# Patient Record
Sex: OTHER/UNKNOWN | URBAN - METROPOLITAN AREA
[De-identification: names, ages, dates, MRNs, and addresses within clinical notes are randomized per-mention and may not be internally consistent; named-entity substitution may affect disease eponyms.]

---

## 2022-08-08 ENCOUNTER — TELEPHONE (OUTPATIENT)
Dept: OBGYN CLINIC | Facility: HOSPITAL | Age: 82
End: 2022-08-08

## 2022-08-08 NOTE — TELEPHONE ENCOUNTER
Patient called to schedule appt with hand/wrist surgeon  Provided locations  Patient declined to schedule

## 2022-12-20 ENCOUNTER — EVALUATION (OUTPATIENT)
Dept: OCCUPATIONAL THERAPY | Facility: CLINIC | Age: 82
End: 2022-12-20

## 2022-12-20 DIAGNOSIS — M79.642 LEFT HAND PAIN: Primary | ICD-10-CM

## 2022-12-20 NOTE — LETTER
2022    Consuelo Santiago MD  [address] [facility] [address]    Patient: Chandni Rees   YOB: 1940   Date of Visit: 2022     Encounter Diagnosis     ICD-10-CM    1  Left hand pain  M79 642           Dear Dr Amy Walker: Thank you for your recent referral of Chandni Rees  Please review the attached evaluation summary from [facility]'s recent visit  Please verify that you agree with the plan of care by signing the attached order  If you have any questions or concerns, please do not hesitate to call  I sincerely appreciate the opportunity to share in the care of one of your patients and hope to have another opportunity to work with you in the near future  Sincerely,    Dennis Jauregui, OT      Referring Provider:     I certify that I have read the below Plan of Care and certify the need for these services furnished under this plan of treatment while under my care  Consuelo Santiago MD  [address] [address]  Via Fax: [phone]        OT Evaluation     Today's date: 2022  Patient name: Chandni Rees  :   MRN: [id number]  Referring provider: Lidya Roth MD  Dx:   Encounter Diagnosis     ICD-10-CM    1  Left hand pain  M79 642           Start Time: 1008  Stop Time: 1058  Total time in clinic (min): 50 minutes    Assessment  Assessment details: Patient reported that on 22 she had trigger finger releases 2nd and 3rd digits left hand  Patient reported that she had ultrasound last week and that she was told she has a lot of scar tissue causing issues  Patient has been splinting index finger to prevent pain with use  Patient reported swelling digits  Chandni Rees is a 81 y o  adult who presents with Left hand pain s/p trigger finger release (primary encounter diagnosis)  Patient tolerated session well   Croey Clayton reported difficulty with activities of daily living secondary to pain with function  Provided home exercise program for digit range of motion, edema control, and scar management  Patient was able to demonstrate home program past instruction with use of handouts  Patient educated on Coflex wrap for nighttime edema control  Patient advised to contact doctor if there is a change of status  Patient advised to discontinue any exercise which cause increased pain  Patient is a good candidate to benefit from skilled occupational therapy to address impairments and return to maximal level of function with minimal symptoms      Impairments: abnormal or restricted ROM, activity intolerance, impaired physical strength, lacks appropriate home exercise program and pain with function  Understanding of Dx/Px/POC: good   Prognosis: good    Goals  Short term goals:  Patient to demonstrate understanding of home exercise program in 2 weeks for decreased pain with activities of daily living  Patient to demonstrate increased  strength to 25 lbs in 4 weeks to increase  on full cup  Patient to demonstrate decreased edema by 1 cm in 4 weeks to increase range of motion for dressing  Patient to increase composite digital flexion to touch distal esqueda crease in 4 weeks to aid in holding utensils  Patient to report a decrease in pain by at least 1 point on a 0-10 scale in 2 weeks to aid in dressing    Long term goals:  Patient to demonstrate functional active range of motion for independent ADL's by time of discharge  Patient to demonstrate functional strength for independent ADL's by time of discharge  Patient to demonstrate understanding of final discharge home exercise program by time of discharge    Plan  Patient would benefit from: OT eval and skilled occupational therapy  Planned modality interventions: ultrasound and thermotherapy: hydrocollator packs  Planned therapy interventions: joint mobilization, manual therapy, massage, strengthening, stretching, therapeutic activities, therapeutic exercise, fine motor coordination training, flexibility, functional ROM exercises, home exercise program, patient education, graded activity and graded exercise  Frequency: 1-2x/week  Duration in weeks: 8  Plan of Care beginning date: 2022  Plan of Care expiration date: 2023  Treatment plan discussed with: patient        Subjective Evaluation    History of Present Illness  Date of surgery: 2022  Mechanism of injury: Patient reported that on 22 she had trigger finger releases 2nd and 3rd digits left hand  Patient reported that she had ultrasound last week and that she was told she has a lot of scar tissue causing issues  Patient has been splinting index finger to prevent pain with use  Patient reported swelling digits  "I can do everything, but it's very very painful  I can't hold anything heavy in it " Patient reported that she has been using lidocaine cream for pain relief  Pain  At best pain ratin  At worst pain rating: 10  Location: left palm- volar MPs  Quality: dull ache  Aggravating factors: lifting    Social Support    Employment status: not working  Hand dominance: right    Treatments  Previous treatment: injection treatment  Patient Goals  Patient goals for therapy: decreased pain, increased motion, increased strength and decreased edema          Objective     Observations     Left Wrist/Hand   Positive for adhesive scar and edema       Neurological Testing     Sensation     Wrist/Hand   Left   Intact: light touch    Right   Intact: light touch    Active Range of Motion     Left Digits    Flexion   Index     MCP: 46    PIP: 50    DIP: 30  Middle     MCP: 42    PIP: 65    DIP: 50  Extension   Index     MCP: -5  Middle     MCP: -5    Right Digits   Flexion   Index     MCP: 72    PIP: 78    DIP: 40  Middle     MCP: 74    PIP: 88    DIP: 68  Extension   Index     MCP: -5  Middle     MCP: -8    Strength/Myotome Testing     Left Wrist/Hand      (2nd hand position) Trial 1: 18 6    Thumb Strength  Palmar/Three-Point Pinch     Trial 1: 3 4    Right Wrist/Hand      (2nd hand position)     Trial 1: 44 9    Thumb Strength   Palmar/Three-Point Pinch     Trial 1: 10 3    Additional Strength Details  Patient reported pain with pinch testing    Swelling     Left Wrist/Hand   Index     Proximal: 7 4 cm  Middle     Proximal: 7 cm  Circumference MCP: 20 cm    Right Wrist/Hand   Index     Proximal: 7 cm  Middle     Proximal: 6 8 cm  Circumference MCP: 19 6 cm            Precautions: Universal     12/20            Visits 1            Manuals             STM- scar HEP                                                   Neuro Re-Ed                                                                                                        Ther Ex             Tendon glides x10            BROM index and long x10            Digit ext x10            Place and hold x10                                                                Ther Activity             Theraputty- , roll, pinch Yellow                                                                Modalities             Moist heat 5'

## 2022-12-20 NOTE — PROGRESS NOTES
OT Evaluation     Today's date: 2022  Patient name: Berta Crowell  :   MRN: [id number]  Referring provider: Hoang Urbano MD  Dx:   Encounter Diagnosis     ICD-10-CM    1  Left hand pain  M79 642           Start Time: 1008  Stop Time: 1058  Total time in clinic (min): 50 minutes    Assessment  Assessment details: Patient reported that on 22 she had trigger finger releases 2nd and 3rd digits left hand  Patient reported that she had ultrasound last week and that she was told she has a lot of scar tissue causing issues  Patient has been splinting index finger to prevent pain with use  Patient reported swelling digits  Berta Crowell is a 81 y o  adult who presents with Left hand pain s/p trigger finger release (primary encounter diagnosis)  Patient tolerated session well  Rhoda Tuttle reported difficulty with activities of daily living secondary to pain with function  Provided home exercise program for digit range of motion, edema control, and scar management  Patient was able to demonstrate home program past instruction with use of handouts  Patient educated on Coflex wrap for nighttime edema control  Patient advised to contact doctor if there is a change of status  Patient advised to discontinue any exercise which cause increased pain  Patient is a good candidate to benefit from skilled occupational therapy to address impairments and return to maximal level of function with minimal symptoms      Impairments: abnormal or restricted ROM, activity intolerance, impaired physical strength, lacks appropriate home exercise program and pain with function  Understanding of Dx/Px/POC: good   Prognosis: good    Goals  Short term goals:  Patient to demonstrate understanding of home exercise program in 2 weeks for decreased pain with activities of daily living  Patient to demonstrate increased  strength to 25 lbs in 4 weeks to increase  on full cup  Patient to demonstrate decreased edema by 1 cm in 4 weeks to increase range of motion for dressing  Patient to increase composite digital flexion to touch distal esqueda crease in 4 weeks to aid in holding utensils  Patient to report a decrease in pain by at least 1 point on a 0-10 scale in 2 weeks to aid in dressing    Long term goals:  Patient to demonstrate functional active range of motion for independent ADL's by time of discharge  Patient to demonstrate functional strength for independent ADL's by time of discharge  Patient to demonstrate understanding of final discharge home exercise program by time of discharge    Plan  Patient would benefit from: OT eval and skilled occupational therapy  Planned modality interventions: ultrasound and thermotherapy: hydrocollator packs  Planned therapy interventions: joint mobilization, manual therapy, massage, strengthening, stretching, therapeutic activities, therapeutic exercise, fine motor coordination training, flexibility, functional ROM exercises, home exercise program, patient education, graded activity and graded exercise  Frequency: 1-2x/week  Duration in weeks: 8  Plan of Care beginning date: 2022  Plan of Care expiration date: 2023  Treatment plan discussed with: patient        Subjective Evaluation    History of Present Illness  Date of surgery: 2022  Mechanism of injury: Patient reported that on 22 she had trigger finger releases 2nd and 3rd digits left hand  Patient reported that she had ultrasound last week and that she was told she has a lot of scar tissue causing issues  Patient has been splinting index finger to prevent pain with use  Patient reported swelling digits  "I can do everything, but it's very very painful  I can't hold anything heavy in it " Patient reported that she has been using lidocaine cream for pain relief    Pain  At best pain ratin  At worst pain rating: 10  Location: left palm- volar MPs  Quality: dull ache  Aggravating factors: lifting    Social Support    Employment status: not working  Hand dominance: right    Treatments  Previous treatment: injection treatment  Patient Goals  Patient goals for therapy: decreased pain, increased motion, increased strength and decreased edema          Objective     Observations     Left Wrist/Hand   Positive for adhesive scar and edema       Neurological Testing     Sensation     Wrist/Hand   Left   Intact: light touch    Right   Intact: light touch    Active Range of Motion     Left Digits    Flexion   Index     MCP: 46    PIP: 50    DIP: 30  Middle     MCP: 42    PIP: 65    DIP: 50  Extension   Index     MCP: -5  Middle     MCP: -5    Right Digits   Flexion   Index     MCP: 72    PIP: 78    DIP: 40  Middle     MCP: 74    PIP: 88    DIP: 68  Extension   Index     MCP: -5  Middle     MCP: -8    Strength/Myotome Testing     Left Wrist/Hand      (2nd hand position)     Trial 1: 18 6    Thumb Strength  Palmar/Three-Point Pinch     Trial 1: 3 4    Right Wrist/Hand      (2nd hand position)     Trial 1: 44 9    Thumb Strength   Palmar/Three-Point Pinch     Trial 1: 10 3    Additional Strength Details  Patient reported pain with pinch testing    Swelling     Left Wrist/Hand   Index     Proximal: 7 4 cm  Middle     Proximal: 7 cm  Circumference MCP: 20 cm    Right Wrist/Hand   Index     Proximal: 7 cm  Middle     Proximal: 6 8 cm  Circumference MCP: 19 6 cm             Precautions: Universal     12/20            Visits 1            Manuals             STM- scar HEP                                                   Neuro Re-Ed                                                                                                        Ther Ex             Tendon glides x10            BROM index and long x10            Digit ext x10            Place and hold x10                                                                Ther Activity             Theraputty- , roll, pinch Yellow Modalities             Moist heat 5'

## 2022-12-28 ENCOUNTER — OFFICE VISIT (OUTPATIENT)
Dept: OCCUPATIONAL THERAPY | Facility: CLINIC | Age: 82
End: 2022-12-28

## 2022-12-28 DIAGNOSIS — M79.642 LEFT HAND PAIN: Primary | ICD-10-CM

## 2022-12-28 NOTE — PROGRESS NOTES
Daily Note     Today's date: 2022  Patient name: Jatin Valle  : [id number]  MRN: [id number]  Referring provider: Tiffanie Irizarry MD  Dx:   Encounter Diagnosis     ICD-10-CM    1  Left hand pain  M79 642           Start Time: 1145  Stop Time: 1235  Total time in clinic (min): 50 minutes    Subjective: Patient reported some soreness with exercises  Objective: See treatment diary below      Assessment: Tolerated treatment well  Patient reported that she's been compliant with home exercises  She reported some discomfort with home exercises and was instructed to perform them twice daily to help with soreness  Patient reported that she wishes to begin ultrasound therapy this date  Use and potential benefits of ultrasound reviewed with patient  Patient tolerated treatment well with intact skin integrity pre/post modality  Patient demonstrated fatigue post treatment and would benefit from continued OT      Plan: Continue per plan of care  Progress treatment as tolerated            Precautions: Universal                Visits 1 2           Manuals             STM- scar HEP 5'                                                  Neuro Re-Ed                                                                                                        Ther Ex             Tendon glides x10 x10           BROM index and long x10 x10           Digit ext x10 x10           Place and hold x10 x10           Putty depress  Dowel in yellow                                                  Ther Activity             Theraputty- , roll, pinch Yellow            In hand manip  gems           Grooved pegs  opp                                     Modalities             Moist heat 5' 5'           Ultrasound  8'

## 2023-01-03 ENCOUNTER — OFFICE VISIT (OUTPATIENT)
Dept: OCCUPATIONAL THERAPY | Facility: CLINIC | Age: 83
End: 2023-01-03

## 2023-01-03 DIAGNOSIS — M79.642 LEFT HAND PAIN: Primary | ICD-10-CM

## 2023-01-03 NOTE — PROGRESS NOTES
Daily Note     Today's date: 1/3/2023  Patient name: Mohamud Wheeler  :   MRN: [id number]  Referring provider: Jhonny Aguilera MD  Dx:   Encounter Diagnosis     ICD-10-CM    1  Left hand pain  M79 642                      Subjective: "only sore right in the palm"  Volar DPC, II and III      Objective: See treatment diary below; Added edema care, scar vibration      Assessment: Tolerated treatment well  Patient reported that she's been compliant with home exercises  Pain isolated to volar MP/DPC of index and middle  Pain with full composite flexion  Reduced knuckle wrinkling observed in index comparing to the right  Edema localized in volar palm, MP/DPC  Plan: Continue per plan of care  Progress treatment as tolerated  Issued left small edema glove for HOS,  Added vibration for scar to HEP, applied kinesiotaping to web and volar palm, to be worn for up to 48 hours  Patient may call for replacement prior to next appointment           Precautions: Universal      1/3          Visits 1 2 3          Manuals   12'          STM- scar HEP 5' Volar palm  5'          kinesiotaping   Web, palm  2'            edema   vibrate  E glove  5'                       Neuro Re-Ed                                                                                                        Ther Ex     18'          Tendon glides x10 x10 Hook  2x10          BROM index and long x10 x10 x10 each          Digit ext x10 x10 P/H extend          Place and hold x10 x10 Fists  1x10          Putty depress  Dowel in yellow           PROM   IP's,   MP's  2x10 each                                    Ther Activity             Theraputty- , roll, pinch Yellow            In hand manip  gems           Grooved pegs  opp                                     Modalities             Moist heat 5' 5' 5'          Ultrasound  8' 8'

## 2023-01-10 ENCOUNTER — OFFICE VISIT (OUTPATIENT)
Dept: OCCUPATIONAL THERAPY | Facility: CLINIC | Age: 83
End: 2023-01-10

## 2023-01-10 DIAGNOSIS — M79.642 LEFT HAND PAIN: Primary | ICD-10-CM

## 2023-01-10 NOTE — PROGRESS NOTES
Daily Note     Today's date: 1/10/2023  Patient name: Sue Clark  :   MRN: [id number]  Referring provider: Fito Fontanez MD  Dx:   Encounter Diagnosis     ICD-10-CM    1  Left hand pain  M79 642                      Subjective: "I am in so much more pain  I carried heavy bags and used a shop vac"  Volar DPC, II and III- flooded basement, clean out over weekend  Objective: See treatment diary below; HEP change due to increase pain  Assessment: Tolerated treatment well  Pain increased to moderate in  volar MP/DPC of index and middle due to overuse  Pain with full composite flexion  Bilateral function reduced due to increased pain  Reduced knuckle wrinkling observed in index comparing to the right  Plan: Continue per plan of care  Progress treatment as tolerated  If pain does not reduce over the next 2 days, therapy will be cancelled until return to physician next Monday  HEP-  Kinesiotape, HP, CP, anti inflammatory/prescription, rest, edema glove          Precautions: Universal     12/20 12/28 1/3 1/10         Visits 1 2 3 4         Manuals   12' 10'         STM- scar HEP 5' Volar palm  5' Volar palm  5'         kinesiotaping   Web, palm  2'   Web palm  2'         edema   vibrate  E glove  5' RG mass  3'                      Neuro Re-Ed                                                                                                        Ther Ex     18'  15'         HEP    CP/HP  Edema glove, oral meds, k tape 10'         PROM digits    Hook, full F, full E  5'         Tendon glides x10 x10 Hook  2x10          BROM index and long x10 x10 x10 each          Digit ext x10 x10 P/H extend          Place and hold x10 x10 Fists  1x10          Putty depress  Dowel in yellow           PROM   IP's,   MP's  2x10 each                                    Ther Activity             Theraputty- , roll, pinch Yellow            In hand manip  gems           Grooved pegs  opp Modalities             Moist heat 5' 5' 5' 5'         Ultrasound  8' 8' hold

## 2023-01-12 ENCOUNTER — APPOINTMENT (OUTPATIENT)
Dept: OCCUPATIONAL THERAPY | Facility: CLINIC | Age: 83
End: 2023-01-12

## 2023-01-17 ENCOUNTER — OFFICE VISIT (OUTPATIENT)
Dept: OCCUPATIONAL THERAPY | Facility: CLINIC | Age: 83
End: 2023-01-17

## 2023-01-17 DIAGNOSIS — M79.642 LEFT HAND PAIN: Primary | ICD-10-CM

## 2023-01-17 NOTE — PROGRESS NOTES
Daily Note     Today's date: 2023  Patient name: Gertrude Rankin  :   MRN: [id number]  Referring provider: Giselle Robles MD  Dx:   Encounter Diagnosis     ICD-10-CM    1  Left hand pain  M79 642                      Subjective: "I am still in so much pain  The same  I did a lot of wringing"  II pain, full digit and  Volar DPC    Objective: See treatment diary below; HEP change due to add night conformer, oral medications, and CP after tasks  Assessment: Tolerated treatment well  Pain continue with  moderate pain in the volar MP/DPC of index and middle due to overuse  Pain with full composite flexion  Bilateral function reduced due to increased pain  Reduced knuckle wrinkling observed in index comparing to the right  Plan: Continue per plan of care  Progress treatment as tolerated  Assess pain level NV;  Consider hand based splint for index immobilization  HEP-  Kinesiotape, HP, CP, anti inflammatory/prescription, rest, edema glove          Precautions: Universal     12/20 12/28 1/3 1/10 1/17        Visits 1 2 3 4 5        Manuals   12' 10'         STM- scar HEP 5' Volar palm  5' Volar palm  5'         kinesiotaping   Web, palm  2'   Web palm  2'         edema   vibrate  E glove  5' RG mass  3'                      Neuro Re-Ed                                                                                                        Ther Ex     18'  15'   15'        HEP    CP/HP  Edema glove, oral meds, k tape 10' NEW HEP for edema, scar pain        PROM digits    Hook, full F, full E  5' Left II, III  5'        Tendon glides x10 x10 Hook  2x10          BROM index and long x10 x10 x10 each          Digit ext x10 x10 P/H extend          Place and hold x10 x10 Fists  1x10          Putty depress  Dowel in yellow           PROM   IP's,   MP's  2x10 each                                    Ther Activity             Theraputty- , roll, pinch Yellow            In hand manip  gems           Grooved pegs  opp                                     Modalities             Moist heat 5' 5' 5' 5' Pre tx        Ultrasound  8' 8' hold 8'

## 2023-01-25 ENCOUNTER — APPOINTMENT (OUTPATIENT)
Dept: OCCUPATIONAL THERAPY | Facility: CLINIC | Age: 83
End: 2023-01-25

## 2023-02-02 ENCOUNTER — OFFICE VISIT (OUTPATIENT)
Dept: OCCUPATIONAL THERAPY | Facility: CLINIC | Age: 83
End: 2023-02-02

## 2023-02-02 DIAGNOSIS — M79.642 LEFT HAND PAIN: Primary | ICD-10-CM

## 2023-02-02 NOTE — PROGRESS NOTES
Daily Note     Today's date: 2023  Patient name: Bethanie Mcintosh  : [id number]  MRN: [id number]  Referring provider: Rosalinda Paulino MD  Dx:   Encounter Diagnosis     ICD-10-CM    1  Left hand pain  M79 642                      Subjective: "I am still in so much pain  The pain is killing me  Nothing has helped"  Right and left index fingers  Right elbow  Objective: See treatment diary below; Modified splinting to provide hand based complete immobilization to the left index finger          Assessment: Tolerated treatment well  Pain continue with  moderate to severe pain in the volar MP/DPC of index and middle fingers  Pain with full composite flexion  Bilateral function reduced due to increased pain  Oval 8 splints ineffective in resolving pain  Plan: Continue per plan of care  Progress treatment as tolerated  Assess pain level NV; Discharge therapy if pain reduction not achieved and recommend return to physician  HEP-  Kinesiotape, HP, CP, anti inflammatory/prescription, rest, edema glove          Precautions: Universal     12/20 12/28 1/3 1/10 1/17 2       Visits 1 2 3 4 5 6       Manuals   12' 10'  10'       STM- scar HEP 5' Volar palm  5' Volar palm  5'  Volar palm  5'       kinesiotaping   Web, palm  2'   Web palm  2'         edema   vibrate  E glove  5' RG mass  3'  RG mass  5'                    Neuro Re-Ed                                       Orthotic      Revise to hand based  13'                                                           Ther Ex     18'  15'   15'   10'       HEP    CP/HP  Edema glove, oral meds, k tape 10' NEW HEP for edema, scar pain HEP-  Splint , no testing trigger       PROM digits    Hook, full F, full E  5' Left II, III  5' Left II, III 5'       Tendon glides x10 x10 Hook  2x10   Hook only   2x10       BROM index and long x10 x10 x10 each   x10 PIP, DIP       Digit ext x10 x10 P/H extend   hold       Place and hold x10 x10 Fists  1x10   hold       Putty depress  Dowel in yellow           PROM   IP's,   MP's  2x10 each                                    Ther Activity             Theraputty- , roll, pinch Yellow            In hand manip  gems           Grooved pegs  opp                                     Modalities             Moist heat 5' 5' 5' 5' Pre tx        Ultrasound  8' 8' hold 8'

## 2023-02-08 ENCOUNTER — OFFICE VISIT (OUTPATIENT)
Dept: OCCUPATIONAL THERAPY | Facility: CLINIC | Age: 83
End: 2023-02-08

## 2023-02-08 DIAGNOSIS — M79.642 LEFT HAND PAIN: Primary | ICD-10-CM

## 2023-02-08 NOTE — PROGRESS NOTES
OT Re-Evaluation  and OT Discharge     Today's date: 2023  Patient name: Harley Holley  : [birth year]  MRN: [id number]  Referring provider: Raza Branch MD  Dx:   Encounter Diagnosis     ICD-10-CM    1  Left hand pain  M79 642                      Assessment  Assessment details: Patient reported that on 22 she had trigger finger releases 2nd and 3rd digits left hand  Patient reported that she had ultrasound last week and that she was told she has a lot of scar tissue causing issues  Patient has been splinting index finger to prevent pain with use  Patient reported swelling digits  Harley Holley is a 81 y o  adult who presents with Left hand pain s/p trigger finger release (primary encounter diagnosis)  Patient tolerated session well  Grant Akbar reported difficulty with activities of daily living secondary to pain with function  Provided home exercise program for digit range of motion, edema control, and scar management  Patient was able to demonstrate home program past instruction with use of handouts  Patient educated on Coflex wrap for nighttime edema control  Patient advised to contact doctor if there is a change of status  Patient advised to discontinue any exercise which cause increased pain  Patient is a good candidate to benefit from skilled occupational therapy to address impairments and return to maximal level of function with minimal symptoms  Impairments: abnormal or restricted ROM, activity intolerance, impaired physical strength, lacks appropriate home exercise program and pain with function      Discharge 2023  Kraig Estrada has been seen for 6 OT visits and has made poor progress in pain and edema reduction of her index fingers  The patient presents with continued pain and edema with reduced  functional use of both her hands  Therapy has been ineffective in achieving pain reduction during functional activity  Recommend discharge from OT    She will be contacting her physician for further assessment  The patient is in agreement with discharge plan    Understanding of Dx/Px/POC: good   Prognosis: fair    Goals  Short term goals:  Patient to demonstrate understanding of home exercise program in 2 weeks for decreased pain with activities of daily living MET  Patient to demonstrate increased  strength to 25 lbs in 4 weeks to increase  on full cup NOT MET  Patient to demonstrate decreased edema by 1 cm in 4 weeks to increase range of motion for dressing NOT MET  Patient to increase composite digital flexion to touch distal esqueda crease in 4 weeks to aid in holding utensils MET  Patient to report a decrease in pain by at least 1 point on a 0-10 scale in 2 weeks to aid in dressing NOT MET    Long term goals:  Patient to demonstrate functional active range of motion for independent ADL's by time of discharge NOT MET  Patient to demonstrate functional strength for independent ADL's by time of discharge NOT MET  Patient to demonstrate understanding of final discharge home exercise program by time of discharge MET    Plan  Patient would benefit from- discharge from OT and return to physician for further medical assessment  Planned modality interventions: ultrasound and thermotherapy: hydrocollator packs  Planned therapy interventions: joint mobilization, manual therapy, massage, strengthening, stretching, therapeutic activities, therapeutic exercise, fine motor coordination training, flexibility, functional ROM exercises, home exercise program, patient education, graded activity and graded exercise  Frequency: 1-2x/week  Duration in weeks: 8  Plan of Care beginning date: 12/20/2022  Plan of Care expiration date: 2/14/2023  Treatment plan discussed with: patient        Subjective Evaluation    History of Present Illness  Date of surgery: 9/9/2022  Mechanism of injury: Patient reported that on 9/14/22 she had trigger finger releases 2nd and 3rd digits left hand   Patient reported that she had ultrasound last week and that she was told she has a lot of scar tissue causing issues  Patient has been splinting index finger to prevent pain with use  Patient reported swelling digits  "I can do everything, but it's very very painful  I can't hold anything heavy in it " Patient reported that she has been using lidocaine cream for pain relief  Pain  At best pain ratin  At worst pain rating: 10  Location: left palm- volar MPs  Quality: dull ache  Aggravating factors: lifting    Social Support    Employment status: not working  Hand dominance: right    Treatments  Previous treatment: injection treatment  Patient Goals  Patient goals for therapy: decreased pain, increased motion, increased strength and decreased edema          Objective     Observations     Left Wrist/Hand   Positive for adhesive scar and edema       Neurological Testing     Sensation     Wrist/Hand   Left   Intact: light touch    Right   Intact: light touch    Active Range of Motion     Left Digits    Flexion   Index     MCP: 46    PIP: 50    DIP: 30  Middle     MCP: 42    PIP: 65    DIP: 50  Extension   Index     MCP: -5  Middle     MCP: -5    Right Digits   Flexion   Index     MCP: 72    PIP: 78    DIP: 40  Middle     MCP: 74    PIP: 88    DIP: 68  Extension   Index     MCP: -5  Middle     MCP: -8    Strength/Myotome Testing     Left Wrist/Hand      (2nd hand position)     Trial 1: 18 6    Thumb Strength  Palmar/Three-Point Pinch     Trial 1: 3 4    Right Wrist/Hand      (2nd hand position)     Trial 1: 44 9    Thumb Strength   Palmar/Three-Point Pinch     Trial 1: 10 3    Additional Strength Details  Patient reported pain with pinch testing    Swelling     Left Wrist/Hand   Index     Proximal: 7 4 cm  Middle     Proximal: 7 cm  Circumference MCP: 20 cm    Right Wrist/Hand   Index     Proximal: 7 cm  Middle     Proximal: 6 8 cm  Circumference MCP: 19 6 cm    Daily Note     Today's date: 2023  Patient name: Stella Bojorquez Hema Doshi  : 1940  MRN: [id number]  Referring provider: Giselle Robles MD  Dx:   Encounter Diagnosis     ICD-10-CM    1  Left hand pain  M79 642                      Subjective: "I am still in so much pain  The pain is killing me  Nothing has helped"  Right and  left index fingers  "The splint made my finger swell"  Too tight strap probable  Objective: See treatment diary below  Assessment: Tolerated treatment well  Pain continues in bilateral index fingers and volar MPs  Minimal reduction of  moderate edema and severe pain  Pain resolves with therapy session and returns quickly with any activity  Pain with full composite flexion  Digital splinting and hand based splinting did not resolve pain or triggering  Plan:  Therapy ineffective in pain control  Patient will return to physician for further assessment  HEP-  Kinesiotape, HP, CP, anti inflammatory/prescription, rest, edema glove  Precautions: Universal     12/20 12/28 1/3 1/10 1/17 2/2 2/8      Visits 1 2 3 4 5 6 7      Manuals   12' 10'  10' 10'      STM- scar HEP 5' Volar palm  5' Volar palm  5'  Volar palm  5' Volar MP, index  5'      kinesiotaping   Web, palm  2'   Web palm  2'         edema   vibrate  E glove  5' RG mass  3'  RG mass  5' RG mass full hand  5'                   Neuro Re-Ed                                       Orthotic      Revise to hand based  13'                                                           Ther Ex     18'  15'   15'   10'   15'      HEP    CP/HP  Edema glove, oral meds, k tape 10' NEW HEP for edema, scar pain HEP-  Splint , no testing trigger HEP review- splints, edema glove    For d/c      PROM digits    Hook, full F, full E  5' Left II, III  5' Left II, III 5' Left II, III  5'      Tendon glides x10 x10 Hook  2x10   Hook only   2x10 Hook  2x10      BROM index and long x10 x10 x10 each   x10 PIP, DIP       Digit ext x10 x10 P/H extend   hold       Place and hold x10 x10 Fists  1x10   hold       Putty depress  Dowel in yellow           PROM   IP's,   MP's  2x10 each                                    Ther Activity             Theraputty- , roll, pinch Yellow            In hand manip  gems           Grooved pegs  opp                                     Modalities             Moist heat 5' 5' 5' 5' Pre tx  Pre tx      Ultrasound  8' 8' hold 8'  8'

## 2023-02-08 NOTE — LETTER
2023    Ronald Correa MD  [address] [facility] [address]    Patient: Pankaj Elliott   YOB: 1940   Date of Visit: 2023     Encounter Diagnosis     ICD-10-CM    1  Left hand pain  M79 642           Dear Dr Rodas Dys: Thank you for your recent referral of Pankaj Elliott  Please review the attached evaluation summary from Jessiea's recent visit  Please verify that you agree with the plan of care by signing the attached order  If you have any questions or concerns, please do not hesitate to call  I sincerely appreciate the opportunity to share in the care of one of your patients and hope to have another opportunity to work with you in the near future  Sincerely,    Michelle Melissa, OT      Referring Provider:     I certify that I have read the below Plan of Care and certify the need for these services furnished under this plan of treatment while under my care  Ronald Correa MD  [address] [facility] [address]  Via Fax: [phone]        OT Re-Evaluation  and OT Discharge     Today's date: 2023  Patient name: Pankaj Elliott  :   MRN: [id number]  Referring provider: Kevin Sanchez MD  Dx:   Encounter Diagnosis     ICD-10-CM    1  Left hand pain  M79 642                      Assessment  Assessment details: Patient reported that on 22 she had trigger finger releases 2nd and 3rd digits left hand  Patient reported that she had ultrasound last week and that she was told she has a lot of scar tissue causing issues  Patient has been splinting index finger to prevent pain with use  Patient reported swelling digits  Pankaj Elliott is a 81 y o  adult who presents with Left hand pain s/p trigger finger release (primary encounter diagnosis)  Patient tolerated session well  Kali Anderson reported difficulty with activities of daily living secondary to pain with function   Provided home exercise program for digit range of motion, edema control, and scar management  Patient was able to demonstrate home program past instruction with use of handouts  Patient educated on Coflex wrap for nighttime edema control  Patient advised to contact doctor if there is a change of status  Patient advised to discontinue any exercise which cause increased pain  Patient is a good candidate to benefit from skilled occupational therapy to address impairments and return to maximal level of function with minimal symptoms  Impairments: abnormal or restricted ROM, activity intolerance, impaired physical strength, lacks appropriate home exercise program and pain with function      Discharge 2/8/2023  Yane Sims has been seen for 6 OT visits and has made poor progress in pain and edema reduction of her index fingers  The patient presents with continued pain and edema with reduced  functional use of both her hands  Therapy has been ineffective in achieving pain reduction during functional activity  Recommend discharge from OT  She will be contacting her physician for further assessment     The patient is in agreement with discharge plan    Understanding of Dx/Px/POC: good   Prognosis: fair    Goals  Short term goals:  Patient to demonstrate understanding of home exercise program in 2 weeks for decreased pain with activities of daily living MET  Patient to demonstrate increased  strength to 25 lbs in 4 weeks to increase  on full cup NOT MET  Patient to demonstrate decreased edema by 1 cm in 4 weeks to increase range of motion for dressing NOT MET  Patient to increase composite digital flexion to touch distal esqueda crease in 4 weeks to aid in holding utensils MET  Patient to report a decrease in pain by at least 1 point on a 0-10 scale in 2 weeks to aid in dressing NOT MET    Long term goals:  Patient to demonstrate functional active range of motion for independent ADL's by time of discharge NOT MET  Patient to demonstrate functional strength for independent ADL's by time of discharge NOT MET  Patient to demonstrate understanding of final discharge home exercise program by time of discharge MET    Plan  Patient would benefit from- discharge from OT and return to physician for further medical assessment  Planned modality interventions: ultrasound and thermotherapy: hydrocollator packs  Planned therapy interventions: joint mobilization, manual therapy, massage, strengthening, stretching, therapeutic activities, therapeutic exercise, fine motor coordination training, flexibility, functional ROM exercises, home exercise program, patient education, graded activity and graded exercise  Frequency: 1-2x/week  Duration in weeks: 8  Plan of Care beginning date: 2022  Plan of Care expiration date: 2023  Treatment plan discussed with: patient        Subjective Evaluation    History of Present Illness  Date of surgery: 2022  Mechanism of injury: Patient reported that on 22 she had trigger finger releases 2nd and 3rd digits left hand  Patient reported that she had ultrasound last week and that she was told she has a lot of scar tissue causing issues  Patient has been splinting index finger to prevent pain with use  Patient reported swelling digits  "I can do everything, but it's very very painful  I can't hold anything heavy in it " Patient reported that she has been using lidocaine cream for pain relief  Pain  At best pain ratin  At worst pain rating: 10  Location: left palm- volar MPs  Quality: dull ache  Aggravating factors: lifting    Social Support    Employment status: not working  Hand dominance: right    Treatments  Previous treatment: injection treatment  Patient Goals  Patient goals for therapy: decreased pain, increased motion, increased strength and decreased edema          Objective     Observations     Left Wrist/Hand   Positive for adhesive scar and edema       Neurological Testing     Sensation Wrist/Hand   Left   Intact: light touch    Right   Intact: light touch    Active Range of Motion     Left Digits    Flexion   Index     MCP: 46    PIP: 50    DIP: 30  Middle     MCP: 42    PIP: 65    DIP: 50  Extension   Index     MCP: -5  Middle     MCP: -5    Right Digits   Flexion   Index     MCP: 72    PIP: 78    DIP: 40  Middle     MCP: 74    PIP: 88    DIP: 68  Extension   Index     MCP: -5  Middle     MCP: -8    Strength/Myotome Testing     Left Wrist/Hand      (2nd hand position)     Trial 1: 18 6    Thumb Strength  Palmar/Three-Point Pinch     Trial 1: 3 4    Right Wrist/Hand      (2nd hand position)     Trial 1: 44 9    Thumb Strength   Palmar/Three-Point Pinch     Trial 1: 10 3    Additional Strength Details  Patient reported pain with pinch testing    Swelling     Left Wrist/Hand   Index     Proximal: 7 4 cm  Middle     Proximal: 7 cm  Circumference MCP: 20 cm    Right Wrist/Hand   Index     Proximal: 7 cm  Middle     Proximal: 6 8 cm  Circumference MCP: 19 6 cm    Daily Note     Today's date: 2023  Patient name: Leandro Juares  :   MRN: [id number]  Referring provider: Justin Ann MD  Dx:   Encounter Diagnosis     ICD-10-CM    1  Left hand pain  M79 642                      Subjective: "I am still in so much pain  The pain is killing me  Nothing has helped"  Right and  left index fingers  "The splint made my finger swell"  Too tight strap probable  Objective: See treatment diary below  Assessment: Tolerated treatment well  Pain continues in bilateral index fingers and volar MPs  Minimal reduction of  moderate edema and severe pain  Pain resolves with therapy session and returns quickly with any activity  Pain with full composite flexion  Digital splinting and hand based splinting did not resolve pain or triggering  Plan:  Therapy ineffective in pain control  Patient will return to physician for further assessment       HEP-  Kinesiotape, HP, CP, anti inflammatory/prescription, rest, edema glove  Precautions: Universal     12/20 12/28 1/3 1/10 1/17 2/2 2/8      Visits 1 2 3 4 5 6 7      Manuals   12' 10'  10' 10'      STM- scar HEP 5' Volar palm  5' Volar palm  5'  Volar palm  5' Volar MP, index  5'      kinesiotaping   Web, palm  2'   Web palm  2'         edema   vibrate  E glove  5' RG mass  3'  RG mass  5' RG mass full hand  5'                   Neuro Re-Ed                                       Orthotic      Revise to hand based  13'                                                           Ther Ex     18'  15'   15'   10'   15'      HEP    CP/HP  Edema glove, oral meds, k tape 10' NEW HEP for edema, scar pain HEP-  Splint 24/7, no testing trigger HEP review- splints, edema glove    For d/c      PROM digits    Hook, full F, full E  5' Left II, III  5' Left II, III 5' Left II, III  5'      Tendon glides x10 x10 Hook  2x10   Hook only   2x10 Hook  2x10      BROM index and long x10 x10 x10 each   x10 PIP, DIP       Digit ext x10 x10 P/H extend   hold       Place and hold x10 x10 Fists  1x10   hold       Putty depress  Dowel in yellow           PROM   IP's,   MP's  2x10 each                                    Ther Activity             Theraputty- , roll, pinch Yellow            In hand manip  gems           Grooved pegs  opp                                     Modalities             Moist heat 5' 5' 5' 5' Pre tx  Pre tx      Ultrasound  8' 8' hold 8'  8'

## 2023-03-02 ENCOUNTER — APPOINTMENT (OUTPATIENT)
Dept: OCCUPATIONAL THERAPY | Facility: CLINIC | Age: 83
End: 2023-03-02